# Patient Record
(demographics unavailable — no encounter records)

---

## 2025-04-21 NOTE — PLAN
[FreeTextEntry1] : 27 y/o p0000 with normal exam and b12 deficiency and fibroadenoma stable b12 def - seeing GI fibroadenoma  -following with Dr. Urban love sent from office, refill cryselle, no absolute contra to bc. f/u for annual

## 2025-04-21 NOTE — HISTORY OF PRESENT ILLNESS
[FreeTextEntry1] : 27 y/o p0000 LMP 2 months ago, extending on ocp,  here for [facility]we. no abn bleeding, pain or dischage. On Cryselle. h/o fibroadnomal follows at dr Duggan.   fibroadenoma  anxiety/depression -- off meds B12 defiency  h/o pcos, elevated testosterone   no med or surgical hx  non smoker

## 2025-04-21 NOTE — PHYSICAL EXAM
[MA] : MA [FreeTextEntry2] : Emily [Appropriately responsive] : appropriately responsive [Alert] : alert [No Acute Distress] : no acute distress [No Lymphadenopathy] : no lymphadenopathy [Soft] : soft [Non-tender] : non-tender [Non-distended] : non-distended [No HSM] : No HSM [No Lesions] : no lesions [No Mass] : no mass [Oriented x3] : oriented x3 [Examination Of The Breasts] : a normal appearance [No Masses] : no breast masses were palpable [Labia Majora] : normal [Labia Minora] : normal [Normal] : normal [Uterine Adnexae] : normal

## 2025-05-15 NOTE — HISTORY OF PRESENT ILLNESS
[FreeTextEntry1] : YVONNE is a 28 year old female who presents for follow up with a personal history of left breast lumpectomy for a fibroadenoma on 11/6/2024.  The patient has a history of bilateral breast nodules and biopsy-proven left breast 2:00 4 cmFN fibroadenoma, 1.4 cm, in 1/2024. Patient most recently underwent a bilateral targeted breast US on 7/23/2024 showing stable appearance to previously biopsied mass in the left breast 2:00 and no significant change in right breast mass 9:00 and left breast mass at 9:00 recommended for additional 6-month follow-up ultrasound to confirm stability. She was noted to have a small soft palpable mass in the left axilla that was found to be accessory breast tissue. She states that it is bothersome in the summertime was interested in removal. I did not encourage that at this time.  JOSEY lifetime risk of 23%; patient has a maternal aunt diagnosed with breast cancer at age 40. The patient also has a family history of colon cancer in her mother at age 50 and maternal great grandmother with possible breast cancer age of diagnosis unknown. The patient underwent panel genetic testing on 10/8/2024 which returned negative for any pathogenic mutations.

## 2025-05-15 NOTE — FAMILY HISTORY
[TextEntry] : Mother colon cancer age 50 Paternal aunt breast cancer age 40 Maternal great grandmother possible breast cancer  Denies any family history of ovarian, pancreatic, prostate cancer

## 2025-05-15 NOTE — DATA REVIEWED
[FreeTextEntry1] : 12/14/2023 ([facility]) bilateral breast US: Left 2:00 palpable mass, ultrasound-guided biopsy recommended. Additional bilateral benign-appearing lesions, pending left breast pathology results, 6-month follow-up targeted bilateral breast US recommended. BI-RADS 4  1/16/2024 ([facility]) left breast 2:00 4 cm FN ultrasound core BX (wing clip): Fibroadenoma, pathology results are benign and concordant, started with follow-up recommended in 6 months for targeted bilateral breast US.  4/12/2024 ([facility]) left breast US: No suspicious cystic or solid lesions are seen. Negative BI-RADS 1  7/23/2024 ([facility]) bilateral targeted breast US: Right 9:00 1 cm FN 1.3 x 0.7 x 1.4 cm ovoid hypoechoic solid mass unchanged from 12/2023. Left 9:00 1 cm FN ovoid hypoechoic mass measuring 0.7 x 0.7 x 0.4 cm, unchanged from 12/2023. Left 2:00 4 cm FN minimally lobulated hypoechoic mass measuring 1.2 x 1.1 x 0.7 cm, corresponds to previous biopsy, not felt to represent an interval change. Additional follow-up ultrasound in 6 months is recommended. Probably benign BI-RADS 3  11/6/2024 ([facility]) left breast lumpectomy: Fibroadenoma with myxoid stroma.  5/15/25 ([facility]) b/l breast US:

## 2025-05-15 NOTE — ASSESSMENT
[FreeTextEntry1] : 27-year-old female with a personal history of left breast lumpectomy on 11/6/2024 for a left breast fibroadenoma now with abnormal breast imaging

## 2025-05-15 NOTE — PHYSICAL EXAM
[Normocephalic] : normocephalic [EOMI] : extra ocular movement intact [Supple] : supple [No Supraclavicular Adenopathy] : no supraclavicular adenopathy [Examined in the supine and seated position] : examined in the supine and seated position [No dominant masses] : no dominant masses in right breast  [No Nipple Retraction] : no left nipple retraction [No Nipple Discharge] : no left nipple discharge [No Axillary Lymphadenopathy] : no left axillary lymphadenopathy [No Rashes] : no rashes [No dominant masses] : no dominant masses left breast [de-identified] : Well-healed lumpectomy incision [de-identified] : Soft superficial palpable mass in the central axillary region, likely accessory breast tissue

## 2025-05-15 NOTE — PAST MEDICAL HISTORY
[Menstruating] : The patient is menstruating [Menarche Age ____] : age at menarche was [unfilled] [Definite ___ (Date)] : the last menstrual period was [unfilled] [Regular Cycle Intervals] : have been regular [Total Preg ___] : G[unfilled] [Live Births ___] : P[unfilled]  [History of Hormone Replacement Treatment] : has no history of hormone replacement treatment [FreeTextEntry6] : No [FreeTextEntry7] : Yes [FreeTextEntry8] : N/A

## 2025-05-15 NOTE — PLAN
[TextEntry] : 12-month follow-up with bilateral sonogram due in May 2026 Patient to perform self-breast exams as instructed in the office. Patient to follow-up in 1 year after imaging completed.